# Patient Record
Sex: FEMALE | Race: WHITE | NOT HISPANIC OR LATINO | Employment: OTHER | URBAN - METROPOLITAN AREA
[De-identification: names, ages, dates, MRNs, and addresses within clinical notes are randomized per-mention and may not be internally consistent; named-entity substitution may affect disease eponyms.]

---

## 2017-01-10 ENCOUNTER — APPOINTMENT (OUTPATIENT)
Dept: RADIOLOGY | Facility: CLINIC | Age: 72
End: 2017-01-10
Attending: OBSTETRICS & GYNECOLOGY
Payer: MEDICARE

## 2017-01-10 ENCOUNTER — OFFICE VISIT (OUTPATIENT)
Dept: OBSTETRICS AND GYNECOLOGY | Facility: CLINIC | Age: 72
End: 2017-01-10
Payer: MEDICARE

## 2017-01-10 VITALS
BODY MASS INDEX: 29.95 KG/M2 | SYSTOLIC BLOOD PRESSURE: 110 MMHG | WEIGHT: 152.56 LBS | DIASTOLIC BLOOD PRESSURE: 80 MMHG | HEIGHT: 60 IN

## 2017-01-10 DIAGNOSIS — R10.2 PELVIC PAIN IN FEMALE: ICD-10-CM

## 2017-01-10 DIAGNOSIS — N95.0 POSTMENOPAUSAL BLEEDING: Primary | ICD-10-CM

## 2017-01-10 PROCEDURE — 99212 OFFICE O/P EST SF 10 MIN: CPT | Mod: S$PBB,,, | Performed by: OBSTETRICS & GYNECOLOGY

## 2017-01-10 PROCEDURE — 76830 TRANSVAGINAL US NON-OB: CPT | Mod: 26,,, | Performed by: RADIOLOGY

## 2017-01-10 PROCEDURE — 99999 PR PBB SHADOW E&M-EST. PATIENT-LVL III: CPT | Mod: PBBFAC,,, | Performed by: OBSTETRICS & GYNECOLOGY

## 2017-01-10 PROCEDURE — 76856 US EXAM PELVIC COMPLETE: CPT | Mod: 26,,, | Performed by: RADIOLOGY

## 2017-01-10 RX ORDER — CHOLECALCIFEROL (VITAMIN D3) 25 MCG
185 TABLET ORAL DAILY
COMMUNITY

## 2017-01-10 RX ORDER — LORATADINE 10 MG/1
10 TABLET ORAL DAILY
COMMUNITY

## 2017-01-10 RX ORDER — MELATONIN 5 MG
1 CAPSULE ORAL NIGHTLY PRN
COMMUNITY

## 2017-01-10 NOTE — MR AVS SNAPSHOT
[facility]  [address]  [place] [address]  Phone: [phone]  Fax: [phone]                  Wendy Barboza   1/10/2017 1:30 PM   Office Visit    Description:  Female : 1945   Provider:  Carmela Singh MD   Department:  [facility]           Reason for Visit     Follow-up           Diagnoses this Visit        Comments    Postmenopausal bleeding    -  Primary            To Do List           Goals (5 Years of Data)     None      Follow-Up and Disposition     Return if symptoms worsen or fail to improve, for annual after 2017.    Follow-up and Disposition History      [facility]     [facility] -  Assistance  Registered nurses in the [facility] Center provide clinical advisement, health education, appointment booking, and other advisory services.  Call for this free service at [phone].             Medications           Message regarding Medications     Verify the changes and/or additions to your medication regime listed below are the same as discussed with your clinician today.  If any of these changes or additions are incorrect, please notify your healthcare provider.        STOP taking these medications     ergocalciferol (VITAMIN D2) 50,000 unit Cap Take 50,000 Units by mouth every 7 days.           Verify that the below list of medications is an accurate representation of the medications you are currently taking.  If none reported, the list may be blank. If incorrect, please contact your healthcare provider. Carry this list with you in case of emergency.           Current Medications     FLUAD 5010-1702, 65 YR UP,,PF, 45 mcg (15 mcg x 3)/0.5 mL Syrg ADM 0.5ML IM UTD    LINACLOTIDE (LINZESS ORAL) Take by mouth.    loratadine (CLARITIN) 10 mg tablet Take 10 mg by mouth once daily.    melatonin 5 mg Cap Take 1 tablet by mouth nightly as needed.    nystatin-triamcinolone (MYCOLOG II) cream Apply to  affected area 2 times daily    vitamin D 1000 units Tab Take 185 mg by mouth once daily.           Clinical Reference Information           Vital Signs - Last Recorded  Most recent update: 1/10/2017  1:50 PM by Kristin Lundberg MA    BP Ht Wt BMI       110/80 5' (1.524 m) 69.2 kg (152 lb 8.9 oz) 29.79 kg/m2       Blood Pressure          Most Recent Value    BP  110/80      Allergies as of 1/10/2017     Cephalexin    Codeine    Doxycycline    Methylprednisolone    Prochlorperazine    Promethazine    Latex      Immunizations Administered on Date of Encounter - 1/10/2017     None

## 2017-01-10 NOTE — PROGRESS NOTES
Wendy Barboza is a 71 y.o. here for repeat U/S due to PMB.  EMS was 5 mm and EMB was normal on 10/7/2016.  Today no change.  EMS 5 mm.    Past Medical History   Diagnosis Date    Abnormal Pap smear of cervix      Cryotherapy over 20 years ago    Fibroid     Osteoarthritis     Osteopenia     PUD (peptic ulcer disease)      Past Surgical History   Procedure Laterality Date    Ovary surgery       - ovarian wedge resection for PCOS    Left knee surgery      Excision of basal cell carcinoma from chin       Social History   Substance Use Topics    Smoking status: Former Smoker     Quit date:     Smokeless tobacco: None    Alcohol use 4.8 oz/week     8 Glasses of wine, 0 Standard drinks or equivalent per week      Comment: Socially     Family History   Problem Relation Age of Onset    Colon cancer Father     Cancer Father     COPD Mother     Lung cancer Mother     Colon cancer Mother     Cancer Mother     Colon cancer Maternal Uncle     Cancer Maternal Uncle     Breast cancer Cousin     Cancer Cousin     Cervical cancer Cousin     Breast cancer Cousin     Cancer Cousin     Ovarian cancer Neg Hx      OB History    Para Term  AB SAB TAB Ectopic Multiple Living   0 0 0 0 0 0 0 0 0 0           ROS:  GENERAL: No fever, chills, fatigability or weight loss.  VULVAR: No pain, no lesions and no itching.  VAGINAL: No relaxation, no itching, no abnormal bleeding and no lesions.  ABDOMEN: No abdominal pain. Denies nausea. Denies vomiting. No diarrhea. No constipation  BREAST: Denies pain. No lumps. No discharge.  URINARY: No incontinence, no nocturia, no frequency and no dysuria.  CARDIOVASCULAR: No chest pain. No shortness of breath. No leg cramps.  NEUROLOGICAL: No headaches. No vision changes.    Vitals:    01/10/17 1348   BP: 110/80   Weight: 69.2 kg (152 lb 8.9 oz)   Height: 5' (1.524 m)   PainSc: 0-No pain     Body mass index is 29.79 kg/(m^2).    ASSESSMENT and  PLAN:  Postmenopausal bleeding  Osteopenia on DEXA with a normal FRAX score.  Weight bearing exercise 2-3 times weekly, calcium and vitamin D daily as discussed.  FOLLOW UP: PRN lack of improvement.  I spent 10 minutes face to face with the patient today.

## 2025-03-19 ENCOUNTER — HOSPITAL ENCOUNTER (OUTPATIENT)
Facility: HOSPITAL | Age: 80
Discharge: HOME | End: 2025-03-19
Attending: EMERGENCY MEDICINE | Admitting: STUDENT IN AN ORGANIZED HEALTH CARE EDUCATION/TRAINING PROGRAM
Payer: MEDICARE

## 2025-03-19 VITALS
WEIGHT: 128 LBS | TEMPERATURE: 98 F | HEART RATE: 80 BPM | HEIGHT: 59 IN | RESPIRATION RATE: 17 BRPM | OXYGEN SATURATION: 99 % | BODY MASS INDEX: 25.8 KG/M2 | DIASTOLIC BLOOD PRESSURE: 82 MMHG | SYSTOLIC BLOOD PRESSURE: 97 MMHG

## 2025-03-19 DIAGNOSIS — I48.91 ATRIAL FIBRILLATION WITH RVR: Primary | ICD-10-CM

## 2025-03-19 DIAGNOSIS — I48.91 ATRIAL FIBRILLATION: ICD-10-CM

## 2025-03-19 LAB
ALBUMIN SERPL BCP-MCNC: 4.4 G/DL (ref 3.5–5.2)
ALP SERPL-CCNC: 59 U/L (ref 55–135)
ALT SERPL W/O P-5'-P-CCNC: 32 U/L (ref 10–44)
ANION GAP SERPL CALC-SCNC: 10 MMOL/L (ref 8–16)
AST SERPL-CCNC: 29 U/L (ref 10–40)
BASOPHILS # BLD AUTO: 0.03 K/UL (ref 0–0.2)
BASOPHILS NFR BLD: 0.8 % (ref 0–1.9)
BILIRUB SERPL-MCNC: 0.6 MG/DL (ref 0.1–1)
BNP SERPL-MCNC: 236 PG/ML (ref 0–99)
BUN SERPL-MCNC: 15 MG/DL (ref 8–23)
CALCIUM SERPL-MCNC: 9.4 MG/DL (ref 8.7–10.5)
CHLORIDE SERPL-SCNC: 104 MMOL/L (ref 95–110)
CO2 SERPL-SCNC: 25 MMOL/L (ref 23–29)
CREAT SERPL-MCNC: 0.7 MG/DL (ref 0.5–1.4)
DIFFERENTIAL METHOD BLD: ABNORMAL
EOSINOPHIL # BLD AUTO: 0.1 K/UL (ref 0–0.5)
EOSINOPHIL NFR BLD: 2.1 % (ref 0–8)
ERYTHROCYTE [DISTWIDTH] IN BLOOD BY AUTOMATED COUNT: 12.1 % (ref 11.5–14.5)
EST. GFR  (NO RACE VARIABLE): >60 ML/MIN/1.73 M^2
GLUCOSE SERPL-MCNC: 100 MG/DL (ref 70–110)
HCT VFR BLD AUTO: 40.1 % (ref 37–48.5)
HGB BLD-MCNC: 13 G/DL (ref 12–16)
IMM GRANULOCYTES # BLD AUTO: 0.01 K/UL (ref 0–0.04)
IMM GRANULOCYTES NFR BLD AUTO: 0.3 % (ref 0–0.5)
LYMPHOCYTES # BLD AUTO: 1.5 K/UL (ref 1–4.8)
LYMPHOCYTES NFR BLD: 38.5 % (ref 18–48)
MAGNESIUM SERPL-MCNC: 2 MG/DL (ref 1.6–2.6)
MCH RBC QN AUTO: 31.1 PG (ref 27–31)
MCHC RBC AUTO-ENTMCNC: 32.4 G/DL (ref 32–36)
MCV RBC AUTO: 96 FL (ref 82–98)
MONOCYTES # BLD AUTO: 0.3 K/UL (ref 0.3–1)
MONOCYTES NFR BLD: 8.9 % (ref 4–15)
NEUTROPHILS # BLD AUTO: 1.9 K/UL (ref 1.8–7.7)
NEUTROPHILS NFR BLD: 49.4 % (ref 38–73)
NRBC BLD-RTO: 0 /100 WBC
OHS QRS DURATION: 94 MS
OHS QTC CALCULATION: 462 MS
PLATELET # BLD AUTO: 141 K/UL (ref 150–450)
PMV BLD AUTO: 12.5 FL (ref 9.2–12.9)
POTASSIUM SERPL-SCNC: 3.5 MMOL/L (ref 3.5–5.1)
PROT SERPL-MCNC: 7.2 G/DL (ref 6–8.4)
RBC # BLD AUTO: 4.18 M/UL (ref 4–5.4)
SODIUM SERPL-SCNC: 139 MMOL/L (ref 136–145)
TROPONIN I SERPL HS-MCNC: 4.1 PG/ML (ref 0–14.9)
TROPONIN I SERPL HS-MCNC: 7.1 PG/ML (ref 0–14.9)
WBC # BLD AUTO: 3.82 K/UL (ref 3.9–12.7)

## 2025-03-19 PROCEDURE — 25000003 PHARM REV CODE 250: Performed by: EMERGENCY MEDICINE

## 2025-03-19 PROCEDURE — 83735 ASSAY OF MAGNESIUM: CPT | Performed by: EMERGENCY MEDICINE

## 2025-03-19 PROCEDURE — 96375 TX/PRO/DX INJ NEW DRUG ADDON: CPT

## 2025-03-19 PROCEDURE — 84484 ASSAY OF TROPONIN QUANT: CPT | Performed by: EMERGENCY MEDICINE

## 2025-03-19 PROCEDURE — G0378 HOSPITAL OBSERVATION PER HR: HCPCS

## 2025-03-19 PROCEDURE — 93005 ELECTROCARDIOGRAM TRACING: CPT | Performed by: GENERAL PRACTICE

## 2025-03-19 PROCEDURE — 80053 COMPREHEN METABOLIC PANEL: CPT | Performed by: EMERGENCY MEDICINE

## 2025-03-19 PROCEDURE — 83880 ASSAY OF NATRIURETIC PEPTIDE: CPT | Performed by: EMERGENCY MEDICINE

## 2025-03-19 PROCEDURE — 96367 TX/PROPH/DG ADDL SEQ IV INF: CPT

## 2025-03-19 PROCEDURE — 93010 ELECTROCARDIOGRAM REPORT: CPT | Mod: ,,, | Performed by: GENERAL PRACTICE

## 2025-03-19 PROCEDURE — 99285 EMERGENCY DEPT VISIT HI MDM: CPT | Mod: 25

## 2025-03-19 PROCEDURE — 96365 THER/PROPH/DIAG IV INF INIT: CPT

## 2025-03-19 PROCEDURE — 85025 COMPLETE CBC W/AUTO DIFF WBC: CPT | Performed by: EMERGENCY MEDICINE

## 2025-03-19 PROCEDURE — 25000003 PHARM REV CODE 250: Performed by: STUDENT IN AN ORGANIZED HEALTH CARE EDUCATION/TRAINING PROGRAM

## 2025-03-19 PROCEDURE — 63600175 PHARM REV CODE 636 W HCPCS: Performed by: STUDENT IN AN ORGANIZED HEALTH CARE EDUCATION/TRAINING PROGRAM

## 2025-03-19 RX ORDER — MAGNESIUM SULFATE 1 G/100ML
1 INJECTION INTRAVENOUS ONCE
Status: COMPLETED | OUTPATIENT
Start: 2025-03-19 | End: 2025-03-19

## 2025-03-19 RX ORDER — ONDANSETRON HYDROCHLORIDE 2 MG/ML
4 INJECTION, SOLUTION INTRAVENOUS EVERY 8 HOURS PRN
Status: DISCONTINUED | OUTPATIENT
Start: 2025-03-19 | End: 2025-03-19 | Stop reason: HOSPADM

## 2025-03-19 RX ORDER — ASPIRIN 325 MG
325 TABLET ORAL
Status: DISCONTINUED | OUTPATIENT
Start: 2025-03-19 | End: 2025-03-19

## 2025-03-19 RX ORDER — ACETAMINOPHEN 500 MG
500 TABLET ORAL EVERY 6 HOURS PRN
COMMUNITY

## 2025-03-19 RX ORDER — TALC
6 POWDER (GRAM) TOPICAL NIGHTLY PRN
Status: DISCONTINUED | OUTPATIENT
Start: 2025-03-19 | End: 2025-03-19 | Stop reason: HOSPADM

## 2025-03-19 RX ORDER — APIXABAN 5 MG/1
5 TABLET, FILM COATED ORAL 2 TIMES DAILY
COMMUNITY
Start: 2025-01-03

## 2025-03-19 RX ORDER — MAGNESIUM 200 MG
1 TABLET ORAL NIGHTLY
COMMUNITY

## 2025-03-19 RX ORDER — FLECAINIDE ACETATE 50 MG/1
50 TABLET ORAL 4 TIMES DAILY
COMMUNITY
Start: 2025-03-18

## 2025-03-19 RX ORDER — CETIRIZINE HYDROCHLORIDE 5 MG/1
5 TABLET ORAL NIGHTLY
COMMUNITY

## 2025-03-19 RX ORDER — LANOLIN ALCOHOL/MO/W.PET/CERES
800 CREAM (GRAM) TOPICAL
Status: DISCONTINUED | OUTPATIENT
Start: 2025-03-19 | End: 2025-03-19 | Stop reason: HOSPADM

## 2025-03-19 RX ORDER — SODIUM,POTASSIUM PHOSPHATES 280-250MG
2 POWDER IN PACKET (EA) ORAL
Status: DISCONTINUED | OUTPATIENT
Start: 2025-03-19 | End: 2025-03-19 | Stop reason: HOSPADM

## 2025-03-19 RX ORDER — METOPROLOL TARTRATE 1 MG/ML
5 INJECTION, SOLUTION INTRAVENOUS
Status: COMPLETED | OUTPATIENT
Start: 2025-03-19 | End: 2025-03-19

## 2025-03-19 RX ORDER — ASPIRIN 325 MG
325 TABLET ORAL
Status: COMPLETED | OUTPATIENT
Start: 2025-03-19 | End: 2025-03-19

## 2025-03-19 RX ORDER — SODIUM CHLORIDE 0.9 % (FLUSH) 0.9 %
10 SYRINGE (ML) INJECTION
Status: DISCONTINUED | OUTPATIENT
Start: 2025-03-19 | End: 2025-03-19 | Stop reason: HOSPADM

## 2025-03-19 RX ORDER — FLECAINIDE ACETATE 50 MG/1
50 TABLET ORAL 4 TIMES DAILY
Status: DISCONTINUED | OUTPATIENT
Start: 2025-03-19 | End: 2025-03-19

## 2025-03-19 RX ORDER — FLECAINIDE ACETATE 50 MG/1
50 TABLET ORAL 4 TIMES DAILY
Status: DISCONTINUED | OUTPATIENT
Start: 2025-03-19 | End: 2025-03-19 | Stop reason: HOSPADM

## 2025-03-19 RX ORDER — ACETAMINOPHEN 325 MG/1
650 TABLET ORAL EVERY 8 HOURS PRN
Status: DISCONTINUED | OUTPATIENT
Start: 2025-03-19 | End: 2025-03-19 | Stop reason: HOSPADM

## 2025-03-19 RX ADMIN — METOROPROLOL TARTRATE 5 MG: 5 INJECTION, SOLUTION INTRAVENOUS at 06:03

## 2025-03-19 RX ADMIN — ASPIRIN 325 MG ORAL TABLET 325 MG: 325 PILL ORAL at 07:03

## 2025-03-19 RX ADMIN — DILTIAZEM HYDROCHLORIDE 5 MG/HR: 100 INJECTION, POWDER, LYOPHILIZED, FOR SOLUTION INTRAVENOUS at 07:03

## 2025-03-19 RX ADMIN — MAGNESIUM SULFATE IN DEXTROSE 1 G: 10 INJECTION, SOLUTION INTRAVENOUS at 02:03

## 2025-03-19 RX ADMIN — APIXABAN 5 MG: 5 TABLET, FILM COATED ORAL at 08:03

## 2025-03-19 RX ADMIN — SODIUM CHLORIDE 500 ML: 9 INJECTION, SOLUTION INTRAVENOUS at 07:03

## 2025-03-19 RX ADMIN — FLECAINIDE ACETATE 50 MG: 50 TABLET ORAL at 04:03

## 2025-03-19 RX ADMIN — FLECAINIDE ACETATE 50 MG: 50 TABLET ORAL at 10:03

## 2025-03-19 NOTE — ED PROVIDER NOTES
Encounter Date: 3/19/2025       History     Chief Complaint   Patient presents with    Tachycardia     Pt woke up this morning w/ palpitations. Hx AFIB     Patient with history of atrial fibrillation woke up this morning with palpitations here has an EKG that shows atrial fibrillation heart rate 127.  No ST elevation.  The patient states she took an extra flecainide at 4:20 a.m..  She takes 50 mg 2 pills in the morning and 2 at night.  She has had 3 attacks this is her 3rd his 2nd attack was  and while being evaluated she converted to a sinus rhythm.  Her heart doctor is a Dr. Castro in memory and she has a house here at [facility] and [facility].  She states she had a negative calcium score recently an echo that showed only mild mitral valve thickening.  She does take magnesium supplement.  She states her heart rate normally is in his 60s and is a regular rhythm      Review of patient's allergies indicates:   Allergen Reactions    Cephalexin Hives    Codeine Itching    Doxycycline Hives    Methylprednisolone Other (See Comments)     Whole body shut down. -- Steroid Paks    Prochlorperazine Itching     Compazine    Promethazine Itching     Phenergan    Latex Rash     Past Medical History:   Diagnosis Date    Abnormal Pap smear of cervix     Cryotherapy over 20 years ago    Fibroid     Osteoarthritis     Osteopenia     PUD (peptic ulcer disease)      Past Surgical History:   Procedure Laterality Date    excision of basal cell carcinoma from chin      left knee surgery      OVARY SURGERY      - ovarian wedge resection for PCOS     Family History   Problem Relation Name Age of Onset    Colon cancer Father 76     Cancer Father 76     COPD Mother 84 -      Lung cancer Mother 84 -      Colon cancer Mother 84 -      Cancer Mother 84 -      Colon cancer Maternal Uncle      Cancer Maternal Uncle      Breast cancer Cousin Dx in 40's     Cancer Cousin  Dx in 40's     Cervical cancer Cousin Dx in 40's     Breast cancer Cousin 71     Cancer Cousin 71     Ovarian cancer Neg Hx       Social History[1]  Review of Systems   Constitutional:  Negative for chills and fever.   HENT:  Negative for ear pain, rhinorrhea and sore throat.    Eyes:  Negative for pain and visual disturbance.   Respiratory:  Negative for cough and shortness of breath.    Cardiovascular:  Positive for palpitations. Negative for chest pain.   Gastrointestinal:  Negative for abdominal pain, constipation, diarrhea, nausea and vomiting.   Genitourinary:  Negative for dysuria, frequency, hematuria and urgency.   Musculoskeletal:  Negative for back pain, joint swelling and myalgias.   Skin:  Negative for rash.   Neurological:  Negative for dizziness, seizures, weakness and headaches.   Psychiatric/Behavioral:  Negative for dysphoric mood. The patient is not nervous/anxious.        Physical Exam     Initial Vitals   BP Pulse Resp Temp SpO2   -- -- -- -- --      MAP       --         Physical Exam    Nursing note and vitals reviewed.  Constitutional: She appears well-developed and well-nourished.   HENT:   Head: Normocephalic and atraumatic.   Eyes: Conjunctivae, EOM and lids are normal. Pupils are equal, round, and reactive to light.   Neck: Trachea normal. Neck supple. No thyroid mass and no thyromegaly present.   Normal range of motion.  Cardiovascular:  Normal rate and normal heart sounds.           Irregular rhythm   Pulmonary/Chest: Effort normal and breath sounds normal.   Abdominal: Abdomen is soft. There is no abdominal tenderness.   Musculoskeletal:         General: Normal range of motion.      Cervical back: Normal range of motion and neck supple.     Neurological: She is alert and oriented to person, place, and time. She has normal strength and normal reflexes. No cranial nerve deficit or sensory deficit.   Skin: Skin is warm and dry.   Psychiatric: She has a normal mood and affect. Her  "speech is normal and behavior is normal. Judgment and thought content normal.       ED Course   Procedures  Labs Reviewed - No data to display       Imaging Results    None          Medications - No data to display  Medical Decision Making  The patient is here for AFib with RVR.  She normally is in a normal sinus rhythm taking flecainide 100 mg twice a day for that.  She took an extra dose at 4:20 a.m. today.  She has no chest pain or shortness of breath she does have the sensation of palpitations.  She is a 3 episodes of atrial fibrillation starting on  as well as  of this year and now tonight.  The plan is to get cardiac labs.  She will be observed to see if she converts.  She states in the past she was given Cardizem that did not help.  Care the patient will be turned over to Dr. Hughes at 6:00 a.m..    Amount and/or Complexity of Data Reviewed  Labs: ordered.  Radiology: ordered.                                      Clinical Impression:   ***Please document a Clinical Impression and click the "Refresh" button to refresh your note and automatically pull in before signing.***                  [1]  Social History  Tobacco Use    Smoking status: Former     Current packs/day: 0.00     Types: Cigarettes     Quit date:      Years since quittin.2   Substance Use Topics    Alcohol use: Yes     Alcohol/week: 8.0 standard drinks of alcohol     Types: 8 Glasses of wine per week     Comment: Socially    Drug use: No   " Results              X-Ray Chest AP Portable (Final result)  Result time 03/19/25 06:43:53      Final result by Anton Brantley MD (03/19/25 06:43:53)                   Impression:      No evidence of active cardiopulmonary disease.      Electronically signed by: Anton Brantley  Date:    03/19/2025  Time:    06:43               Narrative:    EXAMINATION:  XR CHEST AP PORTABLE    CLINICAL HISTORY:  Chest Pain;    FINDINGS:  Portable chest radiograph at 05:53 hours with no prior studies for comparison shows the cardiomediastinal silhouette and pulmonary vasculature are within normal limits.    The lungs are normally expanded, with no consolidation, large pleural effusion, or evidence of pulmonary edema. No pneumothorax.  The bones are diffusely osteopenic.                                       Medications   diltiaZEM 100 mg in dextrose 5% 100 mL IVPB (ready to mix) (titrating) ( Intravenous Not Given 3/19/25 0815)   metoprolol injection 5 mg (5 mg Intravenous Given 3/19/25 0612)   sodium chloride 0.9% bolus 500 mL 500 mL (0 mLs Intravenous Stopped 3/19/25 0801)   aspirin tablet 325 mg (325 mg Oral Given 3/19/25 0708)   magnesium sulfate in dextrose IVPB (premix) 1 g (0 g Intravenous Stopped 3/19/25 1513)     Medical Decision Making  The patient is here for AFib with RVR.  She normally is in a normal sinus rhythm taking flecainide 100 mg twice a day for that.  She took an extra dose at 4:20 a.m. today.  She has no chest pain or shortness of breath she does have the sensation of palpitations.  She is a 3 episodes of atrial fibrillation starting on January 2nd as well as February 27th of this year and now tonight.  The plan is to get cardiac labs.  She will be observed to see if she converts.  She states in the past she was given Cardizem that did not help.  Care the patient will be turned over to Dr. Hughes at 6:00 a.m..    Amount and/or Complexity of Data Reviewed  Labs: ordered.  Radiology: ordered.    Risk  OTC  drugs.  Prescription drug management.                                      Clinical Impression:  Final diagnoses:  [I48.91] Atrial fibrillation with RVR (Primary)          ED Disposition Condition    Observation                   [1]   Social History  Tobacco Use    Smoking status: Former     Current packs/day: 0.00     Types: Cigarettes     Quit date:      Years since quittin.2   Substance Use Topics    Alcohol use: Yes     Alcohol/week: 8.0 standard drinks of alcohol     Types: 8 Glasses of wine per week     Comment: Socially    Drug use: No        Ashlie Montenegro MD  [phone]

## 2025-03-19 NOTE — SUBJECTIVE & OBJECTIVE
Past Medical History:   Diagnosis Date    Abnormal Pap smear of cervix     Cryotherapy over 20 years ago    Fibroid     Osteoarthritis     Osteopenia     PUD (peptic ulcer disease)        Past Surgical History:   Procedure Laterality Date    excision of basal cell carcinoma from chin      left knee surgery  2005    OVARY SURGERY  1980    - ovarian wedge resection for PCOS       Review of patient's allergies indicates:   Allergen Reactions    Cephalexin Hives    Codeine Itching    Doxycycline Hives    Methylprednisolone Other (See Comments)     Whole body shut down. -- Steroid Paks    Prochlorperazine Itching     Compazine    Promethazine Itching     Phenergan    Latex Rash       No current facility-administered medications on file prior to encounter.     Current Outpatient Medications on File Prior to Encounter   Medication Sig    acetaminophen (TYLENOL) 500 MG tablet Take 500 mg by mouth every 6 (six) hours as needed for Pain.    cetirizine (ZYRTEC) 5 MG tablet Take 5 mg by mouth every evening.    ELIQUIS 5 mg Tab Take 5 mg by mouth 2 (two) times daily.    flecainide (TAMBOCOR) 50 MG Tab Take 50 mg by mouth 4 (four) times daily.    LUTEIN ORAL Take 1 tablet by mouth nightly.    magnesium 200 mg Tab Take 1 tablet by mouth every evening.    melatonin 3 mg TbDL Take 1 tablet by mouth nightly as needed (Sleep).    [DISCONTINUED] FLUAD 3655-0635, 65 YR UP,,PF, 45 mcg (15 mcg x 3)/0.5 mL Syrg ADM 0.5ML IM UTD    [DISCONTINUED] LINACLOTIDE (LINZESS ORAL) Take by mouth.    [DISCONTINUED] loratadine (CLARITIN) 10 mg tablet Take 10 mg by mouth once daily.    [DISCONTINUED] nystatin-triamcinolone (MYCOLOG II) cream Apply to affected area 2 times daily    [DISCONTINUED] vitamin D 1000 units Tab Take 185 mg by mouth once daily.     Family History       Problem Relation (Age of Onset)    Breast cancer Cousin, Cousin    COPD Mother    Cancer Father, Mother, Maternal Uncle, Cousin, Cousin    Cervical cancer Cousin    Colon cancer  Father, Mother, Maternal Uncle    Lung cancer Mother          Tobacco Use    Smoking status: Former     Current packs/day: 0.00     Types: Cigarettes     Quit date:      Years since quittin.2    Smokeless tobacco: Not on file   Substance and Sexual Activity    Alcohol use: Yes     Alcohol/week: 8.0 standard drinks of alcohol     Types: 8 Glasses of wine per week     Comment: Socially    Drug use: No    Sexual activity: Not Currently     Partners: Male     Birth control/protection: Post-menopausal     Comment: :  Kyler     Review of Systems   Constitutional:  Negative for chills and fatigue.   Respiratory:  Negative for shortness of breath.    Cardiovascular:  Positive for palpitations. Negative for chest pain.   Gastrointestinal:  Negative for abdominal distention.   Musculoskeletal:  Negative for back pain.   Neurological:  Negative for dizziness.     Objective:     Vital Signs (Most Recent):  Temp: 97.5 °F (36.4 °C) (25 0521)  Pulse: 77 (25 0900)  Resp: 12 (25 0900)  BP: (!) 100/56 (25 0900)  SpO2: 98 % (25 09) Vital Signs (24h Range):  Temp:  [97.5 °F (36.4 °C)] 97.5 °F (36.4 °C)  Pulse:  [] 77  Resp:  [10-20] 12  SpO2:  [96 %-100 %] 98 %  BP: (100-167)/(56-85) 100/56     Weight: 58.1 kg (128 lb)  Body mass index is 25.85 kg/m².     Physical Exam  Constitutional:       General: She is not in acute distress.  Cardiovascular:      Rate and Rhythm: Normal rate. Rhythm irregular.   Pulmonary:      Effort: No respiratory distress.      Breath sounds: No wheezing.   Abdominal:      Palpations: Abdomen is soft.   Musculoskeletal:         General: No swelling.   Skin:     General: Skin is dry.   Neurological:      General: No focal deficit present.      Mental Status: She is alert and oriented to person, place, and time.                Significant Labs: All pertinent labs within the past 24 hours have been reviewed.  CBC:   Recent Labs   Lab 25   WBC  3.82*   HGB 13.0   HCT 40.1   *     CMP:   Recent Labs   Lab 03/19/25  0528      K 3.5      CO2 25      BUN 15   CREATININE 0.7   CALCIUM 9.4   PROT 7.2   ALBUMIN 4.4   BILITOT 0.6   ALKPHOS 59   AST 29   ALT 32   ANIONGAP 10       Significant Imaging: I have reviewed all pertinent imaging results/findings within the past 24 hours.

## 2025-03-19 NOTE — HOSPITAL COURSE
Patient with history of atrial fibrillation came in for palpitations, found to be in AFib RVR.  Started on Cardizem drip which was weaned off and home flecainide was resumed.  Heart rate remained controlled.  Patient felt symptoms have improved.  Patient instructed follow up Cardiology outpatient.  Patient is stable for discharge    Physical Exam  Constitutional:       General: She is not in acute distress.  Cardiovascular:      Rate and Rhythm: Normal rate.  Pulmonary:      Effort: No respiratory distress.      Breath sounds: No wheezing.

## 2025-03-19 NOTE — H&P
[facility] - Emergency Dept  Hospital Medicine  History & Physical    Patient Name: Wendy Barboza  MRN: [id number]  Patient Class: OP- Observation  Admission Date: 3/19/2025  Attending Physician: Kiara Qureshi MD   Primary Care Provider: Crista Primary Doctor         Patient information was obtained from patient and ER records.     Subjective:     Principal Problem:Atrial fibrillation with rapid ventricular response    Chief Complaint:   Chief Complaint   Patient presents with    Tachycardia     Pt woke up this morning w/ palpitations. Hx AFIB        HPI: 79-year-old female with history of atrial fibrillation follows with cardiologist in [place] presented with complaints of palpitations.  Found to have atrial fibrillation with RVR, IV Lopressor was given without any improvement started on IV Cardizem with improvement in heart rates.  Patient reports having palpitations but usually able to break out of it on her own at home.  Patient states that when symptoms started around 430 this morning she took flecainide without any improvement so decided to come to the emergency room.  Patient denies any chest pain, shortness for breath, fever, chills, abdominal pain.  Heart rates controlled on Cardizem drip.  BNP slightly elevated 236.  Chest x-ray unremarkable.    Past Medical History:   Diagnosis Date    Abnormal Pap smear of cervix     Cryotherapy over 20 years ago    Fibroid     Osteoarthritis     Osteopenia     PUD (peptic ulcer disease)        Past Surgical History:   Procedure Laterality Date    excision of basal cell carcinoma from chin      left knee surgery  2005    OVARY SURGERY  1980    - ovarian wedge resection for PCOS       Review of patient's allergies indicates:   Allergen Reactions    Cephalexin Hives    Codeine Itching    Doxycycline Hives    Methylprednisolone Other (See Comments)     Whole body shut down. -- Steroid Paks    Prochlorperazine Itching     Compazine    Promethazine Itching      Phenergan    Latex Rash       No current facility-administered medications on file prior to encounter.     Current Outpatient Medications on File Prior to Encounter   Medication Sig    acetaminophen (TYLENOL) 500 MG tablet Take 500 mg by mouth every 6 (six) hours as needed for Pain.    cetirizine (ZYRTEC) 5 MG tablet Take 5 mg by mouth every evening.    ELIQUIS 5 mg Tab Take 5 mg by mouth 2 (two) times daily.    flecainide (TAMBOCOR) 50 MG Tab Take 50 mg by mouth 4 (four) times daily.    LUTEIN ORAL Take 1 tablet by mouth nightly.    magnesium 200 mg Tab Take 1 tablet by mouth every evening.    melatonin 3 mg TbDL Take 1 tablet by mouth nightly as needed (Sleep).    [DISCONTINUED] FLUAD 5832-9076, 65 YR UP,,PF, 45 mcg (15 mcg x 3)/0.5 mL Syrg ADM 0.5ML IM UTD    [DISCONTINUED] LINACLOTIDE (LINZESS ORAL) Take by mouth.    [DISCONTINUED] loratadine (CLARITIN) 10 mg tablet Take 10 mg by mouth once daily.    [DISCONTINUED] nystatin-triamcinolone (MYCOLOG II) cream Apply to affected area 2 times daily    [DISCONTINUED] vitamin D 1000 units Tab Take 185 mg by mouth once daily.     Family History       Problem Relation (Age of Onset)    Breast cancer Cousin, Cousin    COPD Mother    Cancer Father, Mother, Maternal Uncle, Cousin, Cousin    Cervical cancer Cousin    Colon cancer Father, Mother, Maternal Uncle    Lung cancer Mother          Tobacco Use    Smoking status: Former     Current packs/day: 0.00     Types: Cigarettes     Quit date:      Years since quittin.2    Smokeless tobacco: Not on file   Substance and Sexual Activity    Alcohol use: Yes     Alcohol/week: 8.0 standard drinks of alcohol     Types: 8 Glasses of wine per week     Comment: Socially    Drug use: No    Sexual activity: Not Currently     Partners: Male     Birth control/protection: Post-menopausal     Comment: :  Kyler     Review of Systems   Constitutional:  Negative for chills and fatigue.   Respiratory:  Negative for shortness  of breath.    Cardiovascular:  Positive for palpitations. Negative for chest pain.   Gastrointestinal:  Negative for abdominal distention.   Musculoskeletal:  Negative for back pain.   Neurological:  Negative for dizziness.     Objective:     Vital Signs (Most Recent):  Temp: 97.5 °F (36.4 °C) (03/19/25 0521)  Pulse: 77 (03/19/25 0900)  Resp: 12 (03/19/25 0900)  BP: (!) 100/56 (03/19/25 0900)  SpO2: 98 % (03/19/25 0900) Vital Signs (24h Range):  Temp:  [97.5 °F (36.4 °C)] 97.5 °F (36.4 °C)  Pulse:  [] 77  Resp:  [10-20] 12  SpO2:  [96 %-100 %] 98 %  BP: (100-167)/(56-85) 100/56     Weight: 58.1 kg (128 lb)  Body mass index is 25.85 kg/m².     Physical Exam  Constitutional:       General: She is not in acute distress.  Cardiovascular:      Rate and Rhythm: Normal rate. Rhythm irregular.   Pulmonary:      Effort: No respiratory distress.      Breath sounds: No wheezing.   Abdominal:      Palpations: Abdomen is soft.   Musculoskeletal:         General: No swelling.   Skin:     General: Skin is dry.   Neurological:      General: No focal deficit present.      Mental Status: She is alert and oriented to person, place, and time.                Significant Labs: All pertinent labs within the past 24 hours have been reviewed.  CBC:   Recent Labs   Lab 03/19/25 0528   WBC 3.82*   HGB 13.0   HCT 40.1   *     CMP:   Recent Labs   Lab 03/19/25 0528      K 3.5      CO2 25      BUN 15   CREATININE 0.7   CALCIUM 9.4   PROT 7.2   ALBUMIN 4.4   BILITOT 0.6   ALKPHOS 59   AST 29   ALT 32   ANIONGAP 10       Significant Imaging: I have reviewed all pertinent imaging results/findings within the past 24 hours.  Assessment/Plan:     * Atrial fibrillation with rapid ventricular response  Patient has paroxysmal (<7 days) atrial fibrillation. Patient is currently in atrial fibrillation. [id number] Score: 3. The patients heart rate in the last 24 hours is as follows:  Pulse  Min: 74  Max: 111      Antiarrhythmics  , 4 times daily, Oral  flecainide tablet 50 mg, 4 times daily, Oral    Anticoagulants  , 2 times daily, Oral  apixaban tablet 5 mg, 2 times daily, Oral    Plan  - Replete lytes with a goal of K>4, Mg >2  - Patient is anticoagulated, see medications listed above.  - Patient's afib is currently controlled  - DC Cardizem drip, restart flecainide        VTE Risk Mitigation (From admission, onward)           Ordered     apixaban tablet 5 mg  2 times daily         03/19/25 0809     IP VTE HIGH RISK PATIENT  Once         03/19/25 0809     Place sequential compression device  Until discontinued         03/19/25 0809                       On 03/19/2025, patient should be placed in hospital observation services under my care.             Kiara Qureshi MD  Department of Hospital Medicine  [facility] - Emergency Dept

## 2025-03-19 NOTE — PLAN OF CARE
Patient verbalized understanding of Meléndez, signed       03/19/25 1005   MELÉNDEZ Message   Medicare Outpatient and Observation Notification regarding financial responsibility Explained to patient/caregiver;Signed/date by patient/caregiver   Date MELÉNDEZ was signed 03/19/25   Time MELÉNDEZ was signed 0943

## 2025-03-19 NOTE — ASSESSMENT & PLAN NOTE
Patient has paroxysmal (<7 days) atrial fibrillation. Patient is currently in atrial fibrillation. [id number] Score: 3. The patients heart rate in the last 24 hours is as follows:  Pulse  Min: 74  Max: 111     Antiarrhythmics  , 4 times daily, Oral  flecainide tablet 50 mg, 4 times daily, Oral    Anticoagulants  , 2 times daily, Oral  apixaban tablet 5 mg, 2 times daily, Oral    Plan  - Replete lytes with a goal of K>4, Mg >2  - Patient is anticoagulated, see medications listed above.  - Patient's afib is currently controlled  - DC Cardizem drip, restart flecainide

## 2025-03-19 NOTE — HPI
79-year-old female with history of atrial fibrillation follows with cardiologist in [place] presented with complaints of palpitations.  Found to have atrial fibrillation with RVR, IV Lopressor was given without any improvement started on IV Cardizem with improvement in heart rates.  Patient reports having palpitations but usually able to break out of it on her own at home.  Patient states that when symptoms started around 430 this morning she took flecainide without any improvement so decided to come to the emergency room.  Patient denies any chest pain, shortness for breath, fever, chills, abdominal pain.  Heart rates controlled on Cardizem drip.  BNP slightly elevated 236.  Chest x-ray unremarkable.

## 2025-03-19 NOTE — PLAN OF CARE
[facility] - Emergency Dept  Initial Discharge Assessment       Primary Care Provider: No, Primary Doctor    Admission Diagnosis: Atrial fibrillation with RVR [I48.91]    Admission Date: 3/19/2025  Expected Discharge Date: 3/21/2025    CM met with patient bedside. CM verified demographics, insurance, supports, and PCP.  PCM assessed patient's needs. Patient is able to complete ADLs independently. Patient currently takes eliquis. Patient verified at home DME: none.  Patient verified No HH, No Dialysis, and No Oxygen.     Patient verified pharmacy of choice: [facility] in [place]  Patient confirmed Isabel Barboza [phone]  will be source of transportation at the time of discharge.     Transition of Care Barriers: None    Payor: MEDICARE / Plan: MEDICARE PART A & B / Product Type: Government /     Extended Emergency Contact Information  Primary Emergency Contact: Isabel Barboza  Mobile Phone: [phone]  Relation: Daughter    Discharge Plan A: Home  Discharge Plan B: Home      [facility] - [facility] [address]  [address] [facility] [address] [address]  Phone: [phone] Fax: [phone]      Initial Assessment (most recent)       Adult Discharge Assessment - 03/19/25 1006          Discharge Assessment    Assessment Type Discharge Planning Assessment     Confirmed/corrected address, phone number and insurance Yes     Confirmed Demographics Correct on Facesheet     Source of Information patient     Does patient/caregiver understand observation status Yes     Communicated KARL with patient/caregiver Yes     Reason For Admission Atrial fibrillation with rapid ventricular response     People in Home alone     Facility Arrived From: home     Do you expect to return to your current living situation? Yes     Do you have help at home or someone to help you manage your care at home? Yes     Who are your caregiver(s) and their phone number(s)? Isabel Barboza [phone]     Prior to  hospitilization cognitive status: Alert/Oriented;No Deficits     Current cognitive status: Alert/Oriented;No Deficits     Walking or Climbing Stairs Difficulty no     Dressing/Bathing Difficulty no     Equipment Currently Used at Home none     Readmission within 30 days? No     Patient currently being followed by outpatient case management? No     Do you currently have service(s) that help you manage your care at home? No     Do you take prescription medications? Yes     Do you have prescription coverage? Yes     Coverage [employer]     Do you have any problems affording any of your prescribed medications? No     Is the patient taking medications as prescribed? yes     Who is going to help you get home at discharge? Isabel Barboza, [phone]     How do you get to doctors appointments? car, drives self     Are you on dialysis? No     Do you take coumadin? Yes   Eliquis    Discharge Plan A Home     Discharge Plan B Home     DME Needed Upon Discharge  none     Discharge Plan discussed with: Patient     Transition of Care Barriers None

## 2025-03-19 NOTE — ED PROVIDER NOTES
Encounter Date: 3/19/2025       History     Chief Complaint   Patient presents with    Tachycardia     Pt woke up this morning w/ palpitations. Hx AFIB     79-year-old female presented emergency department with palpitations.  Patient has history of atrial fibrillation.  Patient felt going into atrial fibrillation this morning.  Patient usually takes flecainide and does not stay in atrial fibrillation.  Patient had 2 previous episodes of atrial fibrillation.  Patient on blood thinner.  Patient has discomfort along with the palpitations and does not feel well so presented here.  Denies any dysuria or hematuria.  Denies any focal weakness or numbness.  Patient has slight discomfort in the chest along with palpitations given atrial fibrillation.      Review of patient's allergies indicates:   Allergen Reactions    Cephalexin Hives    Codeine Itching    Doxycycline Hives    Methylprednisolone Other (See Comments)     Whole body shut down. -- Steroid Paks    Prochlorperazine Itching     Compazine    Promethazine Itching     Phenergan    Latex Rash     Past Medical History:   Diagnosis Date    Abnormal Pap smear of cervix     Cryotherapy over 20 years ago    Fibroid     Osteoarthritis     Osteopenia     PUD (peptic ulcer disease)      Past Surgical History:   Procedure Laterality Date    excision of basal cell carcinoma from chin      left knee surgery      OVARY SURGERY      - ovarian wedge resection for PCOS     Family History   Problem Relation Name Age of Onset    Colon cancer Father 76     Cancer Father 76     COPD Mother 84 -      Lung cancer Mother 84 -      Colon cancer Mother 84 -      Cancer Mother 84 -      Colon cancer Maternal Uncle      Cancer Maternal Uncle      Breast cancer Cousin Dx in 40's     Cancer Cousin Dx in 40's     Cervical cancer Cousin Dx in 40's     Breast cancer Cousin 71     Cancer Cousin 71     Ovarian cancer Neg Hx       Social History[1]  Review of Systems    Constitutional: Negative.    HENT: Negative.     Eyes: Negative.    Respiratory: Negative.     Cardiovascular:  Positive for chest pain and palpitations.   Gastrointestinal: Negative.    Endocrine: Negative.    Genitourinary: Negative.    Musculoskeletal: Negative.    Skin: Negative.    Allergic/Immunologic: Negative.    Neurological: Negative.    Hematological: Negative.    Psychiatric/Behavioral: Negative.     All other systems reviewed and are negative.      Physical Exam     Initial Vitals   BP Pulse Resp Temp SpO2   03/19/25 0521 03/19/25 0521 03/19/25 0521 03/19/25 0521 03/19/25 0525   (!) 167/85 108 18 97.5 °F (36.4 °C) 100 %      MAP       --                Physical Exam    Nursing note and vitals reviewed.  Constitutional: She appears well-developed and well-nourished.   HENT:   Head: Normocephalic and atraumatic.   Nose: Nose normal. Mouth/Throat: Oropharynx is clear and moist.   Eyes: Conjunctivae and EOM are normal. Pupils are equal, round, and reactive to light.   Neck: Neck supple. No thyromegaly present. No tracheal deviation present. No JVD present.   Normal range of motion.  Cardiovascular:  Normal heart sounds and intact distal pulses.           No murmur heard.  Irregularly irregular rhythm with intermittent tachycardia   Pulmonary/Chest: Breath sounds normal. No stridor. No respiratory distress. She has no wheezes. She has no rales.   Abdominal: Abdomen is soft. Bowel sounds are normal. She exhibits no distension. There is no abdominal tenderness.   Musculoskeletal:         General: No edema. Normal range of motion.      Cervical back: Normal range of motion and neck supple.     Neurological: She is alert and oriented to person, place, and time. She has normal strength. GCS score is 15. GCS eye subscore is 4. GCS verbal subscore is 5. GCS motor subscore is 6.   Skin: Skin is warm. Capillary refill takes less than 2 seconds.   Psychiatric: She has a normal mood and affect. Thought content  normal.         ED Course   Critical Care    Date/Time: 3/19/2025 6:42 AM    Performed by: Luciano Hughes MD  Authorized by: Lucinao Hughes MD  Direct patient critical care time: 20 minutes  Ordering / reviewing critical care time: 5 minutes  Documentation critical care time: 5 minutes  Total critical care time (exclusive of procedural time) : 30 minutes        Labs Reviewed   CBC W/ AUTO DIFFERENTIAL - Abnormal       Result Value    WBC 3.82 (*)     RBC 4.18      Hemoglobin 13.0      Hematocrit 40.1      MCV 96      MCH 31.1 (*)     MCHC 32.4      RDW 12.1      Platelets 141 (*)     MPV 12.5      Immature Granulocytes 0.3      Gran # (ANC) 1.9      Immature Grans (Abs) 0.01      Lymph # 1.5      Mono # 0.3      Eos # 0.1      Baso # 0.03      nRBC 0      Gran % 49.4      Lymph % 38.5      Mono % 8.9      Eosinophil % 2.1      Basophil % 0.8      Differential Method Automated     B-TYPE NATRIURETIC PEPTIDE - Abnormal     (*)    MAGNESIUM    Magnesium 2.0     COMPREHENSIVE METABOLIC PANEL    Sodium 139      Potassium 3.5      Chloride 104      CO2 25      Glucose 100      BUN 15      Creatinine 0.7      Calcium 9.4      Total Protein 7.2      Albumin 4.4      Total Bilirubin 0.6      Alkaline Phosphatase 59      AST 29      ALT 32      eGFR >60.0      Anion Gap 10     TROPONIN I HIGH SENSITIVITY    Troponin I High Sensitivity 4.1     TROPONIN I HIGH SENSITIVITY     EKG Readings: (Independently Interpreted)   Initial Reading: No STEMI. Rhythm: Atrial Fibrillation. Ectopy: No Ectopy. Conduction: Normal.     ECG Results              EKG 12-lead (In process)        Collection Time Result Time QRS Duration OHS QTC Calculation    03/19/25 05:11:08 03/19/25 05:22:59 94 462                     In process by Interface, Lab In [facility] (03/19/25 05:23:05)                   Narrative:    Test Reason : I48.91,    Vent. Rate : 127 BPM     Atrial Rate :    BPM     P-R Int :    ms          QRS Dur :  94 ms      QT Int : 318 ms        P-R-T Axes :      3 105 degrees    QTcB Int : 462 ms    Atrial fibrillation with rapid ventricular response  Nonspecific ST and T wave abnormality  Abnormal ECG  No previous ECGs available    Referred By:            Confirmed By:                                   Imaging Results              X-Ray Chest AP Portable (Final result)  Result time 03/19/25 06:43:53      Final result by Anton Brantley MD (03/19/25 06:43:53)                   Impression:      No evidence of active cardiopulmonary disease.      Electronically signed by: Anton Brantley  Date:    03/19/2025  Time:    06:43               Narrative:    EXAMINATION:  XR CHEST AP PORTABLE    CLINICAL HISTORY:  Chest Pain;    FINDINGS:  Portable chest radiograph at 05:53 hours with no prior studies for comparison shows the cardiomediastinal silhouette and pulmonary vasculature are within normal limits.    The lungs are normally expanded, with no consolidation, large pleural effusion, or evidence of pulmonary edema. No pneumothorax.  The bones are diffusely osteopenic.                                    X-Rays:   Independently Interpreted Readings:   Other Readings:  Chest x-ray unremarkable    Medications   diltiaZEM 100 mg in dextrose 5% 100 mL IVPB (ready to mix) (titrating) (has no administration in time range)   sodium chloride 0.9% bolus 500 mL 500 mL (has no administration in time range)   aspirin tablet 325 mg (has no administration in time range)   metoprolol injection 5 mg (5 mg Intravenous Given 3/19/25 0612)     Medical Decision Making  79-year-old female with chest discomfort and palpitations.  Patient in atrial fibrillation and has intermittent rapid ventricular response.  Rate control with Lopressor in the emergency department.  Given persistent atrial fibrillation started on a drip of Cardizem.  Small fluid bolus given.  Patient otherwise nontoxic and patient on blood thinners.  Screening cardiac workup reviewed and BNP slightly elevated however  clinical presentation not consistent with fluid overload.  Hospital Medicine consulted for evaluation for further management and Cardiology to be consulted as needed.    Amount and/or Complexity of Data Reviewed  Labs: ordered. Decision-making details documented in ED Course.  Radiology: ordered. Decision-making details documented in ED Course.  ECG/medicine tests: independent interpretation performed. Decision-making details documented in ED Course.    Risk  OTC drugs.  Prescription drug management.  Decision regarding hospitalization.                                      Clinical Impression:  Final diagnoses:  [I48.91] Atrial fibrillation with RVR (Primary)          ED Disposition Condition    Admit Critical                  [1]   Social History  Tobacco Use    Smoking status: Former     Current packs/day: 0.00     Types: Cigarettes     Quit date:      Years since quittin.2   Substance Use Topics    Alcohol use: Yes     Alcohol/week: 8.0 standard drinks of alcohol     Types: 8 Glasses of wine per week     Comment: Socially    Drug use: No        Luciano Hughes MD  25 [phone]

## 2025-03-19 NOTE — PLAN OF CARE
Pt clear for DC from case management standpoint. Discharging to home.    Transportation will be provided by self or step-daughter Isabel    Cardiologist will call her back with an appointment date and time.       03/19/25 7579   Final Note   Assessment Type Final Discharge Note   Anticipated Discharge Disposition Home

## 2025-03-19 NOTE — DISCHARGE SUMMARY
[facility] - Emergency Dept  Hospital Medicine  Discharge Summary      Patient Name: Wendy Barboza  MRN: [id number]  FARHAN: [id number]  Patient Class: OP- Observation  Admission Date: 3/19/2025  Hospital Length of Stay: 0 days  Discharge Date and Time: 3/19/2025  4:41 PM  Attending Physician: Crista att. providers found   Discharging Provider: Kiara Qureshi MD  Primary Care Provider: Crista, Primary Doctor    Primary Care Team: Networked reference to record PCT     HPI:   79-year-old female with history of atrial fibrillation follows with cardiologist in [place] presented with complaints of palpitations.  Found to have atrial fibrillation with RVR, IV Lopressor was given without any improvement started on IV Cardizem with improvement in heart rates.  Patient reports having palpitations but usually able to break out of it on her own at home.  Patient states that when symptoms started around 430 this morning she took flecainide without any improvement so decided to come to the emergency room.  Patient denies any chest pain, shortness for breath, fever, chills, abdominal pain.  Heart rates controlled on Cardizem drip.  BNP slightly elevated 236.  Chest x-ray unremarkable.    * No surgery found *      Hospital Course:   Patient with history of atrial fibrillation came in for palpitations, found to be in AFib RVR.  Started on Cardizem drip which was weaned off and home flecainide was resumed.  Heart rate remained controlled.  Patient felt symptoms have improved.  Patient instructed follow up Cardiology outpatient.  Patient is stable for discharge    Physical Exam  Constitutional:       General: She is not in acute distress.  Cardiovascular:      Rate and Rhythm: Normal rate.  Pulmonary:      Effort: No respiratory distress.      Breath sounds: No wheezing.      Goals of Care Treatment Preferences:  Code Status: Full Code         Consults:     Assessment & Plan    Final Active Diagnoses:      Problems Resolved  During this Admission:    Diagnosis Date Noted Date Resolved POA    PRINCIPAL PROBLEM:  Atrial fibrillation with rapid ventricular response [I48.91] 03/19/2025 03/19/2025 Yes       Discharged Condition: stable    Disposition: Home or Self Care    Follow Up:   Follow-up Information       Loy Castro III, MD Follow up on 3/26/2025.    Specialty: Cardiology  Why: Dr Castro's office will you call with an appointment.  Contact information:  [address] [facility]  [address]  [place] [id number] [phone]                           Patient Instructions:      Diet Adult Regular     Notify your health care provider if you experience any of the following:  temperature >100.4     Notify your health care provider if you experience any of the following:  persistent nausea and vomiting or diarrhea     Notify your health care provider if you experience any of the following:  severe uncontrolled pain     Notify your health care provider if you experience any of the following:  redness, tenderness, or signs of infection (pain, swelling, redness, odor or green/yellow discharge around incision site)     Activity as tolerated       Significant Diagnostic Studies: Labs: CMP   Recent Labs   Lab 03/19/25  0528      K 3.5      CO2 25      BUN 15   CREATININE 0.7   CALCIUM 9.4   PROT 7.2   ALBUMIN 4.4   BILITOT 0.6   ALKPHOS 59   AST 29   ALT 32   ANIONGAP 10    and CBC   Recent Labs   Lab 03/19/25  0528   WBC 3.82*   HGB 13.0   HCT 40.1   *       Pending Diagnostic Studies:       None           Medications:  Reconciled Home Medications:      Medication List        CONTINUE taking these medications      acetaminophen 500 MG tablet  Commonly known as: TYLENOL  Take 500 mg by mouth every 6 (six) hours as needed for Pain.     cetirizine 5 MG tablet  Commonly known as: ZYRTEC  Take 5 mg by mouth every evening.     ELIQUIS 5 mg Tab  Generic drug: apixaban  Take 5 mg by mouth 2 (two) times daily.     flecainide 50  MG Tab  Commonly known as: TAMBOCOR  Take 50 mg by mouth 4 (four) times daily.     LUTEIN ORAL  Take 1 tablet by mouth nightly.     magnesium 200 mg Tab  Take 1 tablet by mouth every evening.     melatonin 3 mg Tbdl  Take 1 tablet by mouth nightly as needed (Sleep).              Indwelling Lines/Drains at time of discharge:   Lines/Drains/Airways       None                   Time spent on the discharge of patient: 33 minutes         Kiara Qureshi MD  Department of Hospital Medicine  [facility] - Emergency Dept

## 2025-03-29 LAB
OHS QRS DURATION: 94 MS
OHS QTC CALCULATION: 462 MS